# Patient Record
Sex: MALE | Race: WHITE | Employment: OTHER | URBAN - NONMETROPOLITAN AREA
[De-identification: names, ages, dates, MRNs, and addresses within clinical notes are randomized per-mention and may not be internally consistent; named-entity substitution may affect disease eponyms.]

---

## 2018-09-07 ENCOUNTER — TELEPHONE (OUTPATIENT)
Dept: NEUROSURGERY | Age: 63
End: 2018-09-07

## 2018-10-01 ENCOUNTER — OFFICE VISIT (OUTPATIENT)
Dept: NEUROSURGERY | Age: 63
End: 2018-10-01
Payer: OTHER MISCELLANEOUS

## 2018-10-01 ENCOUNTER — TELEPHONE (OUTPATIENT)
Dept: NEUROSURGERY | Age: 63
End: 2018-10-01

## 2018-10-01 ENCOUNTER — HOSPITAL ENCOUNTER (OUTPATIENT)
Dept: GENERAL RADIOLOGY | Age: 63
Discharge: HOME | End: 2018-10-01
Payer: OTHER MISCELLANEOUS

## 2018-10-01 VITALS
HEIGHT: 70 IN | OXYGEN SATURATION: 97 % | SYSTOLIC BLOOD PRESSURE: 126 MMHG | BODY MASS INDEX: 28.63 KG/M2 | DIASTOLIC BLOOD PRESSURE: 73 MMHG | HEART RATE: 70 BPM | WEIGHT: 200 LBS

## 2018-10-01 DIAGNOSIS — M54.2 NECK PAIN: Primary | ICD-10-CM

## 2018-10-01 DIAGNOSIS — M79.601 BILATERAL ARM PAIN: ICD-10-CM

## 2018-10-01 DIAGNOSIS — M54.41 CHRONIC MIDLINE LOW BACK PAIN WITH RIGHT-SIDED SCIATICA: ICD-10-CM

## 2018-10-01 DIAGNOSIS — M54.89 INTERSCAPULAR PAIN: ICD-10-CM

## 2018-10-01 DIAGNOSIS — R20.0 NUMBNESS OF LEFT HAND: ICD-10-CM

## 2018-10-01 DIAGNOSIS — G89.29 CHRONIC MIDLINE LOW BACK PAIN WITH RIGHT-SIDED SCIATICA: ICD-10-CM

## 2018-10-01 DIAGNOSIS — M25.531 PAIN IN BOTH WRISTS: ICD-10-CM

## 2018-10-01 DIAGNOSIS — M79.602 BILATERAL ARM PAIN: ICD-10-CM

## 2018-10-01 DIAGNOSIS — R20.0 NUMBNESS OF RIGHT FOOT: ICD-10-CM

## 2018-10-01 DIAGNOSIS — M25.532 PAIN IN BOTH WRISTS: ICD-10-CM

## 2018-10-01 PROCEDURE — 72110 X-RAY EXAM L-2 SPINE 4/>VWS: CPT

## 2018-10-01 PROCEDURE — 99204 OFFICE O/P NEW MOD 45 MIN: CPT | Performed by: NURSE PRACTITIONER

## 2018-10-01 RX ORDER — PANTOPRAZOLE SODIUM 40 MG/1
40 GRANULE, DELAYED RELEASE ORAL
COMMUNITY

## 2018-10-01 RX ORDER — LOSARTAN POTASSIUM 100 MG/1
100 TABLET ORAL DAILY
COMMUNITY

## 2018-10-01 RX ORDER — PRAVASTATIN SODIUM 40 MG
40 TABLET ORAL DAILY
COMMUNITY

## 2018-10-01 RX ORDER — CARVEDILOL 6.25 MG/1
6.25 TABLET ORAL 2 TIMES DAILY WITH MEALS
COMMUNITY

## 2018-10-01 RX ORDER — GLIPIZIDE 10 MG/1
10 TABLET ORAL
COMMUNITY

## 2018-10-01 RX ORDER — AMLODIPINE BESYLATE 5 MG/1
5 TABLET ORAL DAILY
COMMUNITY

## 2018-10-01 ASSESSMENT — ENCOUNTER SYMPTOMS
DOUBLE VISION: 0
PHOTOPHOBIA: 0
ABDOMINAL PAIN: 1
NAUSEA: 0
EYE REDNESS: 0
EYE PAIN: 0
SPUTUM PRODUCTION: 0
BLOOD IN STOOL: 0
EYE DISCHARGE: 0
CONSTIPATION: 0
BACK PAIN: 1
VOMITING: 0
HEARTBURN: 1
BLURRED VISION: 1
COUGH: 0
HEMOPTYSIS: 0
WHEEZING: 0
DIARRHEA: 1
ORTHOPNEA: 1
SHORTNESS OF BREATH: 1
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
## 2018-10-11 ENCOUNTER — HOSPITAL ENCOUNTER (OUTPATIENT)
Dept: GENERAL RADIOLOGY | Age: 63
Discharge: HOME | End: 2018-10-11
Payer: OTHER MISCELLANEOUS

## 2018-10-11 ENCOUNTER — OFFICE VISIT (OUTPATIENT)
Dept: NEUROSURGERY | Age: 63
End: 2018-10-11
Payer: OTHER MISCELLANEOUS

## 2018-10-11 VITALS
HEART RATE: 68 BPM | WEIGHT: 200 LBS | OXYGEN SATURATION: 100 % | BODY MASS INDEX: 28.63 KG/M2 | DIASTOLIC BLOOD PRESSURE: 73 MMHG | HEIGHT: 70 IN | SYSTOLIC BLOOD PRESSURE: 167 MMHG

## 2018-10-11 DIAGNOSIS — M54.2 NECK PAIN: ICD-10-CM

## 2018-10-11 DIAGNOSIS — R20.8 DECREASED SENSATION: ICD-10-CM

## 2018-10-11 DIAGNOSIS — R29.898 LEFT ARM WEAKNESS: ICD-10-CM

## 2018-10-11 DIAGNOSIS — M25.512 CHRONIC PAIN OF BOTH SHOULDERS: ICD-10-CM

## 2018-10-11 DIAGNOSIS — M54.89 INTERSCAPULAR PAIN: ICD-10-CM

## 2018-10-11 DIAGNOSIS — M25.511 CHRONIC PAIN OF BOTH SHOULDERS: ICD-10-CM

## 2018-10-11 DIAGNOSIS — G89.29 CHRONIC PAIN OF BOTH SHOULDERS: ICD-10-CM

## 2018-10-11 DIAGNOSIS — M48.02 FORAMINAL STENOSIS OF CERVICAL REGION: Primary | ICD-10-CM

## 2018-10-11 DIAGNOSIS — R29.810 FACIAL DROOP: ICD-10-CM

## 2018-10-11 DIAGNOSIS — R47.9 SPEAKING DIFFICULTY: ICD-10-CM

## 2018-10-11 DIAGNOSIS — M50.30 DDD (DEGENERATIVE DISC DISEASE), CERVICAL: ICD-10-CM

## 2018-10-11 PROCEDURE — 99213 OFFICE O/P EST LOW 20 MIN: CPT | Performed by: NURSE PRACTITIONER

## 2018-10-11 PROCEDURE — 72072 X-RAY EXAM THORAC SPINE 3VWS: CPT

## 2018-10-11 RX ORDER — HYDROXYZINE HYDROCHLORIDE 25 MG/1
TABLET, FILM COATED ORAL
COMMUNITY

## 2018-10-11 RX ORDER — TRAMADOL HYDROCHLORIDE 50 MG/1
TABLET ORAL
COMMUNITY
End: 2018-10-11

## 2018-10-11 RX ORDER — CYCLOBENZAPRINE HCL 10 MG
TABLET ORAL
COMMUNITY

## 2018-10-11 RX ORDER — NAPROXEN 500 MG/1
TABLET ORAL
COMMUNITY

## 2018-10-11 RX ORDER — IBUPROFEN 600 MG/1
TABLET ORAL
COMMUNITY
End: 2018-10-11

## 2018-10-11 RX ORDER — GABAPENTIN 100 MG/1
CAPSULE ORAL
COMMUNITY

## 2018-10-18 ENCOUNTER — TELEPHONE (OUTPATIENT)
Dept: NEUROLOGY | Age: 63
End: 2018-10-18

## 2018-11-13 ENCOUNTER — OFFICE VISIT (OUTPATIENT)
Dept: NEUROSURGERY | Age: 63
End: 2018-11-13
Payer: MEDICARE

## 2018-11-13 VITALS
HEART RATE: 87 BPM | HEIGHT: 70 IN | SYSTOLIC BLOOD PRESSURE: 170 MMHG | BODY MASS INDEX: 27.2 KG/M2 | DIASTOLIC BLOOD PRESSURE: 81 MMHG | WEIGHT: 190 LBS | OXYGEN SATURATION: 98 %

## 2018-11-13 DIAGNOSIS — M25.511 CHRONIC PAIN OF BOTH SHOULDERS: ICD-10-CM

## 2018-11-13 DIAGNOSIS — M54.89 INTERSCAPULAR PAIN: ICD-10-CM

## 2018-11-13 DIAGNOSIS — M25.512 CHRONIC PAIN OF BOTH SHOULDERS: ICD-10-CM

## 2018-11-13 DIAGNOSIS — M48.02 FORAMINAL STENOSIS OF CERVICAL REGION: Primary | ICD-10-CM

## 2018-11-13 DIAGNOSIS — R20.8 DECREASED SENSATION: ICD-10-CM

## 2018-11-13 DIAGNOSIS — G89.29 CHRONIC PAIN OF BOTH SHOULDERS: ICD-10-CM

## 2018-11-13 DIAGNOSIS — M79.602 BILATERAL ARM PAIN: ICD-10-CM

## 2018-11-13 DIAGNOSIS — R20.0 NUMBNESS OF LEFT HAND: ICD-10-CM

## 2018-11-13 DIAGNOSIS — M79.601 BILATERAL ARM PAIN: ICD-10-CM

## 2018-11-13 DIAGNOSIS — R29.898 LEFT ARM WEAKNESS: ICD-10-CM

## 2018-11-13 PROCEDURE — G8419 CALC BMI OUT NRM PARAM NOF/U: HCPCS | Performed by: NEUROLOGICAL SURGERY

## 2018-11-13 PROCEDURE — 3017F COLORECTAL CA SCREEN DOC REV: CPT | Performed by: NEUROLOGICAL SURGERY

## 2018-11-13 PROCEDURE — G8484 FLU IMMUNIZE NO ADMIN: HCPCS | Performed by: NEUROLOGICAL SURGERY

## 2018-11-13 PROCEDURE — 1036F TOBACCO NON-USER: CPT | Performed by: NEUROLOGICAL SURGERY

## 2018-11-13 PROCEDURE — 99213 OFFICE O/P EST LOW 20 MIN: CPT | Performed by: NEUROLOGICAL SURGERY

## 2018-11-13 PROCEDURE — G8428 CUR MEDS NOT DOCUMENT: HCPCS | Performed by: NEUROLOGICAL SURGERY
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
## 2019-04-15 ENCOUNTER — TELEPHONE (OUTPATIENT)
Dept: NEUROSURGERY | Age: 64
End: 2019-04-15

## 2019-04-15 NOTE — TELEPHONE ENCOUNTER
Spoke with Maryjane Parkinson on the phone from [address] she is mailing a check for Jamil

## 2025-06-13 NOTE — PROGRESS NOTES
Physical Therapy Progress Note    Visit Type: Progress Note  Visit: 13  Referring Provider: Elo Seth, *  Medical Diagnosis (from order): M17.12 - Primary osteoarthritis of left knee  Z96.651 - History of total right knee replacement  R26.9 - Abnormality of gait and mobility  R68.89 - Activity intolerance  M25.569 - Knee pain     SUBJECTIVE                                                                                                               Patient has returned to therapy due to ongoing stiffness in reaching terminal knee extension and force production in lower extremity. This has negatively impacted his ability to perform low transfers including floor transfers. He requests guidance in continued progress in these deficits.   Functional Change: Stairs and chair transfer ease   Current Functional Limitations: Floor transfer       OBJECTIVE                                                                                                                     Range of Motion (ROM)   (degrees unless noted; active unless noted; norms in ( ); negative=lacking to 0, positive=beyond 0)  Knee:   - Flexion (150):       Left:  130    - Extension (0-10):       Left:  -5                          Treatment     Therapeutic Exercise  - nustep warm up with subjective intake x5 minutes    - leg press 65# x20, 75#x10, 85# x10   - heel propped knee extension hang x1 minute, with 2# weight proximal to patella x1 minute 5# distal third of thigh x1 minute  - seated heel slide following above for pain inhibition x20     Skilled input: verbal instruction/cues, tactile instruction/cues and facilitation    Writer verbally educated and received verbal consent for hand placement, positioning of patient, and techniques to be performed today from patient for therapist position for techniques and hand placement and palpation for techniques as described above and how they are pertinent to the patient's plan of care.  Home Exercise  Spine    Review of Systems   Constitutional: Positive for chills, fever and malaise/fatigue. Negative for diaphoresis and weight loss. HENT: Negative. Eyes: Positive for blurred vision. Negative for double vision, photophobia, pain, discharge and redness. Respiratory: Positive for shortness of breath. Negative for cough, hemoptysis, sputum production and wheezing. Cardiovascular: Positive for chest pain, orthopnea, claudication, leg swelling and PND. Gastrointestinal: Positive for abdominal pain, diarrhea and heartburn. Negative for blood in stool, constipation, melena, nausea and vomiting. Genitourinary: Positive for frequency. Negative for dysuria, flank pain, hematuria and urgency. Musculoskeletal: Positive for back pain, joint pain and neck pain. Negative for falls and myalgias. Skin: Positive for itching. Negative for rash. Neurological: Positive for dizziness, tingling, speech change, weakness and headaches. Negative for tremors, sensory change, focal weakness, seizures and loss of consciousness. Endo/Heme/Allergies: Positive for polydipsia. Negative for environmental allergies. Does not bruise/bleed easily. Psychiatric/Behavioral: Positive for depression and memory loss. Negative for hallucinations, substance abuse and suicidal ideas. The patient is nervous/anxious. The patient does not have insomnia. Program  Access Code: [email or web address]  URL: [email or web address]  Date: 03/20/2025  Prepared by: Franklin Ballard    Exercises  - Mini Squat with Counter Support  - 1-2 x daily - 10 reps  - Seated Hamstring Stretch  - 1 x daily - 7 x weekly - 3 sets - 30 hold  - Standing Gastroc Stretch on Step  - 1 x daily - 7 x weekly - 3 sets - 30 hold  - Supine Knee Extension Stretch on Towel Roll  - 1 x daily - 7 x weekly - 3 sets - 1-5 minutes hold      ASSESSMENT                                                                                                            Gains in skilled therapy to date as expected in knee mobility for flexion but not extension.  Patient attends therapy as recommended.  Patient challenged with recommended HEP due to adherence. Patient has been prioritizing ADL performance but challenge with selective extension arom progression.   Progress toward discharge/long term goals (see below for specific status updates): good progress  Medically necessary skilled therapy interventions continue to be required to allow the patient to maximize their functional abilities as noted above and as noted in goal status.    Great progress in knee flexion to date. Some regression in knee extension since last seen in therapy. Prioritization of posterior chain lengthening to promote TKE will be utilized in anticipated 1-2 more appointments prn.   Education:   - Results of above outlined education: Verbalizes understanding, Demonstrates understanding and Needs reinforcement    PLAN                                                                                                                          Modify interventions, frequency and duration per below.  Frequency / Duration  1 times per week tapering as patient progresses for 8 weeks for an estimated total of 8 visits    Suggestions for next session as indicated: Progress per plan of care monitor terminal knee ext, trial IASTM, note knee ext hang heel  propped vs prone     Goals  Long Term Goals: to be met by end of plan of care  1. By the end of 10 week plan of care, Patient will ascend and descend one flight of stairs (12 steps or more) using reciprocal pattern, independently without rail(s) to complete home management and self care. Status: met  met  2. By the end of 10 week plan of care, Patient will walk 200 feet or greater on paved outdoor surfaces without assistive device (or least assistive device) to indicate significantly decreased level of disability with walking and moving. Status: met met  3. By the end of 10 week plan of care, Patient will lift from floor and to waist height lifting 20 lbs , without pain/symptoms and without reported difficulty for carrying a laundry basket.  Status: met  Patient reports met with tool manipulation   4. Patient will be independent with progressed and modified home exercise program.  Status: partially met  Good progress to date   5. Patient will be able to perform floor transfer with upper extremity support on sturdy nearby object independently Status: partially met  Difficulty and instability reported         Therapy procedure time and total treatment time can be found documented on the Time Entry flowsheet